# Patient Record
Sex: MALE | Race: WHITE | NOT HISPANIC OR LATINO | Employment: STUDENT | URBAN - METROPOLITAN AREA
[De-identification: names, ages, dates, MRNs, and addresses within clinical notes are randomized per-mention and may not be internally consistent; named-entity substitution may affect disease eponyms.]

---
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
## 2020-05-27 RX ORDER — BENZONATATE 100 MG/1
CAPSULE ORAL
COMMUNITY
Start: 2016-09-06

## 2020-05-27 RX ORDER — FLUTICASONE PROPIONATE 50 MCG
2 SPRAY, SUSPENSION (ML) NASAL
COMMUNITY
Start: 2017-02-10

## 2020-05-27 RX ORDER — NAPROXEN 500 MG/1
TABLET ORAL
COMMUNITY
Start: 2015-01-17

## 2020-05-27 RX ORDER — TRIAMCINOLONE ACETONIDE 0.1 %
PASTE (GRAM) DENTAL
COMMUNITY
Start: 2015-05-05

## 2020-05-27 RX ORDER — ACETAMINOPHEN AND CODEINE PHOSPHATE 300; 30 MG/1; MG/1
TABLET ORAL
COMMUNITY
Start: 2014-11-19

## 2020-05-27 RX ORDER — BUTALBITAL, ACETAMINOPHEN AND CAFFEINE 50; 325; 40 MG/1; MG/1; MG/1
CAPSULE ORAL
COMMUNITY
Start: 2017-02-07

## 2020-05-27 RX ORDER — DEXTROMETHORPHAN HYDROBROMIDE AND PROMETHAZINE HYDROCHLORIDE 15; 6.25 MG/5ML; MG/5ML
5 SYRUP ORAL
COMMUNITY
Start: 2017-02-10

## 2020-06-01 ENCOUNTER — OFFICE VISIT (OUTPATIENT)
Dept: GASTROENTEROLOGY | Facility: CLINIC | Age: 20
End: 2020-06-01
Payer: COMMERCIAL

## 2020-06-01 ENCOUNTER — TELEPHONE (OUTPATIENT)
Dept: GASTROENTEROLOGY | Facility: CLINIC | Age: 20
End: 2020-06-01

## 2020-06-01 VITALS
HEART RATE: 68 BPM | WEIGHT: 150 LBS | HEIGHT: 69 IN | DIASTOLIC BLOOD PRESSURE: 70 MMHG | BODY MASS INDEX: 22.22 KG/M2 | SYSTOLIC BLOOD PRESSURE: 100 MMHG

## 2020-06-01 DIAGNOSIS — R10.32 LEFT LOWER QUADRANT ABDOMINAL PAIN: Primary | ICD-10-CM

## 2020-06-01 DIAGNOSIS — K59.1 FUNCTIONAL DIARRHEA: ICD-10-CM

## 2020-06-01 PROCEDURE — 99204 OFFICE O/P NEW MOD 45 MIN: CPT | Performed by: INTERNAL MEDICINE

## 2020-06-01 RX ORDER — ONDANSETRON 4 MG/1
4 TABLET, FILM COATED ORAL
COMMUNITY

## 2020-06-01 RX ORDER — ONDANSETRON 4 MG/1
4 TABLET, FILM COATED ORAL EVERY 8 HOURS PRN
Qty: 60 TABLET | Refills: 2 | Status: SHIPPED | OUTPATIENT
Start: 2020-06-01

## 2020-06-01 RX ORDER — PHENOBARBITAL, HYOSCYAMINE SULFATE, ATROPINE SULFATE AND SCOPOLAMINE HYDROBROMIDE .0194; .1037; 16.2; .0065 MG/1; MG/1; MG/1; MG/1
1 TABLET ORAL EVERY 6 HOURS PRN
Qty: 60 TABLET | Refills: 3 | Status: SHIPPED | OUTPATIENT
Start: 2020-06-01 | End: 2020-06-03

## 2020-06-02 DIAGNOSIS — K59.1 FUNCTIONAL DIARRHEA: ICD-10-CM

## 2020-06-02 DIAGNOSIS — R10.32 LEFT LOWER QUADRANT ABDOMINAL PAIN: ICD-10-CM

## 2020-06-03 RX ORDER — PHENOBARBITAL, HYOSCYAMINE SULFATE, ATROPINE SULFATE, SCOPOLAMINE HYDROBROMIDE 16.2; .1037; .0194; .0065 MG/1; MG/1; MG/1; MG/1
TABLET ORAL
Qty: 60 TABLET | Refills: 3 | Status: SHIPPED | OUTPATIENT
Start: 2020-06-03

## 2020-06-13 ENCOUNTER — APPOINTMENT (OUTPATIENT)
Dept: LAB | Facility: HOSPITAL | Age: 20
End: 2020-06-13
Attending: INTERNAL MEDICINE
Payer: COMMERCIAL

## 2020-06-13 DIAGNOSIS — K59.1 FUNCTIONAL DIARRHEA: ICD-10-CM

## 2020-06-13 DIAGNOSIS — R10.32 LEFT LOWER QUADRANT ABDOMINAL PAIN: ICD-10-CM

## 2020-06-13 LAB
CRP SERPL QL: <3 MG/L
ERYTHROCYTE [SEDIMENTATION RATE] IN BLOOD: 1 MM/HOUR (ref 2–10)

## 2020-06-13 PROCEDURE — 86140 C-REACTIVE PROTEIN: CPT

## 2020-06-13 PROCEDURE — 86255 FLUORESCENT ANTIBODY SCREEN: CPT

## 2020-06-13 PROCEDURE — 83516 IMMUNOASSAY NONANTIBODY: CPT

## 2020-06-13 PROCEDURE — 85652 RBC SED RATE AUTOMATED: CPT

## 2020-06-13 PROCEDURE — 36415 COLL VENOUS BLD VENIPUNCTURE: CPT

## 2020-06-13 PROCEDURE — 86671 FUNGUS NES ANTIBODY: CPT

## 2020-06-16 ENCOUNTER — OFFICE VISIT (OUTPATIENT)
Dept: GASTROENTEROLOGY | Facility: CLINIC | Age: 20
End: 2020-06-16
Payer: COMMERCIAL

## 2020-06-16 VITALS
HEART RATE: 80 BPM | HEIGHT: 69 IN | DIASTOLIC BLOOD PRESSURE: 76 MMHG | BODY MASS INDEX: 22.42 KG/M2 | SYSTOLIC BLOOD PRESSURE: 110 MMHG | WEIGHT: 151.4 LBS

## 2020-06-16 DIAGNOSIS — K58.2 IRRITABLE BOWEL SYNDROME WITH BOTH CONSTIPATION AND DIARRHEA: Primary | ICD-10-CM

## 2020-06-16 LAB
BAKER'S YEAST IGG QN IA: 17 UNITS (ref 0–50)
CHITOBIOSIDE IGA SERPL IA-ACNC: 22 UNITS (ref 0–90)
IBD COMMENTS: NORMAL
LAMINARIBIOSIDE IGG SERPL IA-ACNC: 11 UNITS (ref 0–60)
MANNOBIOSIDE IGG SERPL IA-ACNC: 40 UNITS (ref 0–100)
P-ANCA ATYPICAL SER QL IF: NEGATIVE

## 2020-06-16 PROCEDURE — 99214 OFFICE O/P EST MOD 30 MIN: CPT | Performed by: INTERNAL MEDICINE

## 2020-06-16 RX ORDER — HYOSCYAMINE SULFATE 0.125 MG
0.12 TABLET ORAL EVERY 4 HOURS PRN
Qty: 30 TABLET | Refills: 0 | Status: SHIPPED | OUTPATIENT
Start: 2020-06-16

## 2020-08-22 ENCOUNTER — APPOINTMENT (EMERGENCY)
Dept: RADIOLOGY | Facility: HOSPITAL | Age: 20
End: 2020-08-22
Payer: COMMERCIAL

## 2020-08-22 ENCOUNTER — HOSPITAL ENCOUNTER (EMERGENCY)
Facility: HOSPITAL | Age: 20
Discharge: HOME | End: 2020-08-23
Attending: EMERGENCY MEDICINE | Admitting: EMERGENCY MEDICINE
Payer: COMMERCIAL

## 2020-08-22 DIAGNOSIS — S16.1XXA ACUTE STRAIN OF NECK MUSCLE, INITIAL ENCOUNTER: ICD-10-CM
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
## 2020-08-22 PROCEDURE — 99284 EMERGENCY DEPT VISIT MOD MDM: CPT

## 2020-08-22 PROCEDURE — 72040 X-RAY EXAM NECK SPINE 2-3 VW: CPT

## 2020-08-22 PROCEDURE — 99284 EMERGENCY DEPT VISIT MOD MDM: CPT | Performed by: EMERGENCY MEDICINE

## 2020-08-22 RX ORDER — IBUPROFEN 400 MG/1
800 TABLET ORAL ONCE
Status: COMPLETED | OUTPATIENT
Start: 2020-08-22 | End: 2020-08-22

## 2020-08-22 RX ADMIN — IBUPROFEN 800 MG: 400 TABLET ORAL at 23:49

## 2020-08-23 VITALS
RESPIRATION RATE: 18 BRPM | SYSTOLIC BLOOD PRESSURE: 122 MMHG | TEMPERATURE: 98.3 F | HEART RATE: 75 BPM | OXYGEN SATURATION: 97 % | DIASTOLIC BLOOD PRESSURE: 69 MMHG

## 2020-08-23 RX ORDER — IBUPROFEN 800 MG/1
800 TABLET ORAL 3 TIMES DAILY
Qty: 21 TABLET | Refills: 0 | Status: SHIPPED | OUTPATIENT
Start: 2020-08-23

## 2020-08-23 RX ORDER — CYCLOBENZAPRINE HCL 10 MG
10 TABLET ORAL ONCE
Status: COMPLETED | OUTPATIENT
Start: 2020-08-23 | End: 2020-08-23

## 2020-08-23 RX ORDER — CYCLOBENZAPRINE HCL 10 MG
10 TABLET ORAL 2 TIMES DAILY PRN
Qty: 14 TABLET | Refills: 0 | Status: SHIPPED | OUTPATIENT
Start: 2020-08-23

## 2020-08-23 RX ADMIN — CYCLOBENZAPRINE HYDROCHLORIDE 10 MG: 10 TABLET, FILM COATED ORAL at 00:39
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
## 2024-02-01 ENCOUNTER — OFFICE VISIT (OUTPATIENT)
Dept: DERMATOLOGY | Facility: CLINIC | Age: 24
End: 2024-02-01
Payer: COMMERCIAL

## 2024-02-01 VITALS — TEMPERATURE: 98.6 F | HEIGHT: 70 IN | WEIGHT: 182 LBS | BODY MASS INDEX: 26.05 KG/M2

## 2024-02-01 DIAGNOSIS — D22.9 MULTIPLE MELANOCYTIC NEVI: Primary | ICD-10-CM

## 2024-02-01 DIAGNOSIS — L81.4 LENTIGO: ICD-10-CM

## 2024-02-01 DIAGNOSIS — L85.3 XEROSIS OF SKIN: ICD-10-CM

## 2024-02-01 PROCEDURE — 99203 OFFICE O/P NEW LOW 30 MIN: CPT | Performed by: DERMATOLOGY

## 2024-02-01 RX ORDER — CEPHALEXIN 500 MG/1
CAPSULE ORAL
COMMUNITY
Start: 2024-01-19

## 2024-02-01 NOTE — PATIENT INSTRUCTIONS
"MELANOCYTIC NEVI (\"Moles\")        Assessment and Plan:  Based on a thorough discussion of this condition and the management approach to it (including a comprehensive discussion of the known risks, side effects and potential benefits of treatment), the patient (family) agrees to implement the following specific plan:  When outside we recommend using a wide brim hat, sunglasses, long sleeve and pants, sunscreen with SPF 30+ with reapplication every 2 hours, or SPF specific clothing   Benign, reassured  Annual skin check     Melanocytic Nevi  Melanocytic nevi (\"moles\") are tan or brown, raised or flat areas of the skin which have an increased number of melanocytes. Melanocytes are the cells in our body which make pigment and account for skin color.    Some moles are present at birth (I.e., \"congenital nevi\"), while others come up later in life (i.e., \"acquired nevi\").  The sun can stimulate the body to make more moles.  Sunburns are not the only thing that triggers more moles.  Chronic sun exposure can do it too.     Clinically distinguishing a healthy mole from melanoma may be difficult, even for experienced dermatologists. The \"ABCDE's\" of moles have been suggested as a means of helping to alert a person to a suspicious mole and the possible increased risk of melanoma.  The suggestions for raising alert are as follows:    Asymmetry: Healthy moles tend to be symmetric, while melanomas are often asymmetric.  Asymmetry means if you draw a line through the mole, the two halves do not match in color, size, shape, or surface texture. Asymmetry can be a result of rapid enlargement of a mole, the development of a raised area on a previously flat lesion, scaling, ulceration, bleeding or scabbing within the mole.  Any mole that starts to demonstrate \"asymmetry\" should be examined promptly by a board certified dermatologist.     Border: Healthy moles tend to have discrete, even borders.  The border of a melanoma often blends " "into the normal skin and does not sharply delineate the mole from normal skin.  Any mole that starts to demonstrate \"uneven borders\" should be examined promptly by a board certified dermatologist.     Color: Healthy moles tend to be one color throughout.  Melanomas tend to be made up of different colors ranging from dark black, blue, white, or red.  Any mole that demonstrates a color change should be examined promptly by a board certified dermatologist.     Diameter: Healthy moles tend to be smaller than 0.6 cm in size; an exception are \"congenital nevi\" that can be larger.  Melanomas tend to grow and can often be greater than 0.6 cm (1/4 of an inch, or the size of a pencil eraser). This is only a guideline, and many normal moles may be larger than 0.6 cm without being unhealthy.  Any mole that starts to change in size (small to bigger or bigger to smaller) should be examined promptly by a board certified dermatologist.     Evolving: Healthy moles tend to \"stay the same.\"  Melanomas may often show signs of change or evolution such as a change in size, shape, color, or elevation.  Any mole that starts to itch, bleed, crust, burn, hurt, or ulcerate or demonstrate a change or evolution should be examined promptly by a board certified dermatologist.        LENTIGO      Assessment and Plan:  Based on a thorough discussion of this condition and the management approach to it (including a comprehensive discussion of the known risks, side effects and potential benefits of treatment), the patient (family) agrees to implement the following specific plan:  When outside we recommend using a wide brim hat, sunglasses, long sleeve and pants, sunscreen with SPF 30+ with reapplication every 2 hours, or SPF specific clothing       What is a lentigo?  A lentigo is a pigmented flat or slightly raised lesion with a clearly defined edge. Unlike an ephelis (freckle), it does not fade in the winter months. There are several kinds of " "lentigo.  The name lentigo originally referred to its appearance resembling a small lentil. The plural of lentigo is lentigines, although “lentigos” is also in common use.    Who gets lentigines?  Lentigines can affect males and females of all ages and races. Solar lentigines are especially prevalent in fair skinned adults. Lentigines associated with syndromes are present at birth or arise during childhood.    What causes lentigines?  Common forms of lentigo are due to exposure to ultraviolet radiation:  Sun damage including sunburn   Indoor tanning   Phototherapy, especially photochemotherapy (PUVA)    Ionizing radiation, eg radiation therapy, can also cause lentigines.  Several familial syndromes associated with widespread lentigines originate from mutations in Alphonso-MAP kinase, mTOR signaling and PTEN pathways.    What is the treatment for lentigines?  Most lentigines are left alone. Attempts to lighten them may not be successful. The following approaches are used:  SPF 50+ broad-spectrum sunscreen   Hydroquinone bleaching cream   Alpha hydroxy acids   Vitamin C   Retinoids   Azelaic acid   Chemical peels  Individual lesions can be permanently removed using:  Cryotherapy   Intense pulsed light   Pigment lasers    How can lentigines be prevented?  Lentigines associated with exposure ultraviolet radiation can be prevented by very careful sun protection. Clothing is more successful at preventing new lentigines than are sunscreens.    What is the outlook for lentigines?  Lentigines usually persist. They may increase in number with age and sun exposure. Some in sun-protected sites may fade and disappear.            XEROSIS (\"DRY SKIN\")        Assessment and Plan:  Based on a thorough discussion of this condition and the management approach to it (including a comprehensive discussion of the known risks, side effects and potential benefits of treatment), the patient (family) agrees to implement the following specific " plan:  Use moisturizer like Eucerin,Cerave or Aveeno Cream 3 times a day for the dry skin            Dry skin refers to skin that feels dry to touch. Dry skin has a dull surface with a rough, scaly quality. The skin is less pliable and cracked. When dryness is severe, the skin may become inflamed and fissured.  Although any body site can be dry, dry skin tends to affect the shins more than any other site.    Dry skin is lacking moisture in the outer horny cell layer (stratum corneum) and this results in cracks in the skin surface.  Dry skin is also called xerosis, xeroderma or asteatosis (lack of fat).  It can affect males and females of all ages. There is some racial variability in water and lipid content of the skin.  Dry skin that starts in early childhood may be one of about 20 types of ichthyosis (fish-scale skin). There is often a family history of dry skin.   Dry skin is commonly seen in people with atopic dermatitis.  Nearly everyone > 60 years has dry skin.    Dry skin that begins later may be seen in people with certain diseases and conditions.  Postmenopausal women  Hypothyroidism  Chronic renal disease   Malnutrition and weight loss   Subclinical dermatitis   Treatment with certain drugs such as oral retinoids, diuretics and epidermal growth factor receptor inhibitors      What is the treatment for dry skin?  The mainstay of treatment of dry skin and ichthyosis is moisturisers/emollients. They should be applied liberally and often enough to:  Reduce itch   Improve the barrier function   Prevent entry of irritants, bacteria   Reduce transepidermal water loss.      How can dry skin be prevented?  Eliminate aggravating factors:  Reduce the frequency of bathing.   A humidifier in winter and air conditioner in summer   Compare having a short shower with a prolonged soak in a bath.   Use lukewarm, not hot, water.   Replace standard soap with a substitute such as a synthetic detergent cleanser, water-miscible  emollient, bath oil, anti-pruritic tar oil, colloidal oatmeal etc.   Apply an emollient liberally and often, particularly shortly after bathing, and when itchy. The drier the skin, the thicker this should be, especially on the hands.    What is the outlook for dry skin?  A tendency to dry skin may persist life-long, or it may improve once contributing factors are controlled.

## 2024-02-01 NOTE — PROGRESS NOTES
"[facility] Note     Patient Name: Riley Christina  Encounter Date: 2/1/2024    Have you been cared for by a [facility]atologist in the last 3 years and, if so, which description applies to you?    NO.   I am considered a \"new\" patient and must complete all patient intake questions. I am MALE/not capable of bearing children.    REVIEW OF SYSTEMS:  Have you recently had or currently have any of the following? Recent fever or chills? No  Any non-healing wound? No   PAST MEDICAL HISTORY:  Have you personally ever had or currently have any of the following?  If \"YES,\" then please provide more detail. Skin cancer (such as Melanoma, Basal Cell Carcinoma, Squamous Cell Carcinoma?  No  Tuberculosis, HIV/AIDS, Hepatitis B or C: No  Radiation Treatment No   HISTORY OF IMMUNOSUPPRESSION:   Do you have a history of any of the following:  Systemic Immunosuppression such as Diabetes, Biologic or Immunotherapy, Chemotherapy, Organ Transplantation, Bone Marrow Transplantation?  No     Answering \"YES\" requires the addition of the dotphrase \"IMMUNOSUPPRESSED\" as the first diagnosis of the patient's visit.   FAMILY HISTORY:  Any \"first degree relatives\" (parent, brother, sister, or child) with the following?    Skin Cancer, Pancreatic or Other Cancer? No   PATIENT EXPERIENCE:    Do you want the Dermatologist to perform a COMPLETE skin exam today including a clinical examination under the \"bra and underwear\" areas?  NO groin and buttocks not examined   If necessary, do we have your permission to call and leave a detailed message on your Preferred Phone number that includes your specific medical information?  Yes      No Known Allergies   Current Outpatient Medications:     Butalbital-APAP-Caffeine -40 MG per capsule, , Disp: , Rfl:     cephalexin (KEFLEX) 500 mg capsule, take 1 capsule by mouth four times a day for 7 days, Disp: , Rfl:     acetaminophen-codeine (TYLENOL #3) 300-30 mg per tablet, Take by " mouth (Patient not taking: Reported on 2/1/2024), Disp: , Rfl:     benzonatate (TESSALON PERLES) 100 mg capsule, Take by mouth (Patient not taking: Reported on 2/1/2024), Disp: , Rfl:     cyclobenzaprine (FLEXERIL) 10 mg tablet, Take 1 tablet (10 mg total) by mouth 2 (two) times a day as needed for muscle spasms for up to 14 doses (Patient not taking: Reported on 2/1/2024), Disp: 14 tablet, Rfl: 0    fluticasone (FLONASE) 50 mcg/act nasal spray, 2 sprays into each nostril (Patient not taking: Reported on 2/1/2024), Disp: , Rfl:     hyoscyamine (ANASPAZ,LEVSIN) 0.125 MG tablet, Take 1 tablet (0.125 mg total) by mouth every 4 (four) hours as needed for cramping (Patient not taking: Reported on 2/1/2024), Disp: 30 tablet, Rfl: 0    ibuprofen (MOTRIN) 800 mg tablet, Take 1 tablet (800 mg total) by mouth 3 (three) times a day (Patient not taking: Reported on 2/1/2024), Disp: 21 tablet, Rfl: 0    naproxen (NAPROSYN) 500 mg tablet, Take by mouth (Patient not taking: Reported on 2/1/2024), Disp: , Rfl:     ondansetron (ZOFRAN) 4 mg tablet, Take 4 mg by mouth (Patient not taking: Reported on 2/1/2024), Disp: , Rfl:     ondansetron (ZOFRAN) 4 mg tablet, Take 1 tablet (4 mg total) by mouth every 8 (eight) hours as needed for nausea or vomiting (Patient not taking: Reported on 2/1/2024), Disp: 60 tablet, Rfl: 2    PHENOHYTRO 16.2 MG TABS, TAKE 1 TABLET BY MOUTH EVERY 6 HOURS AS NEEDED (ABDOMINAL PAIN) (Patient not taking: Reported on 6/16/2020), Disp: 60 tablet, Rfl: 3    promethazine-dextromethorphan (PHENERGAN-DM) 6.25-15 mg/5 mL oral syrup, Take 5 mL by mouth (Patient not taking: Reported on 2/1/2024), Disp: , Rfl:     triamcinolone (KENALOG) 0.1 % oral topical paste, by Transmucosal route (Patient not taking: Reported on 2/1/2024), Disp: , Rfl:           Whom besides the patient is providing clinical information about today's encounter?   NO ADDITIONAL HISTORIAN (patient alone provided history)    Physical Exam and  "Assessment/Plan by Diagnosis:  Patient here for skin exam, has new moles that have appeared in the past year.     MELANOCYTIC NEVI (\"Moles\")    Physical Exam:  Anatomic Location Affected:   Mostly on sun-exposed areas of the trunk and extremities  Morphological Description:  Scattered, 1-4mm round to ovoid, symmetrical-appearing, even bordered, skin colored to dark brown macules/papules, mostly in sun-exposed areas  Pertinent Positives:  Pertinent Negatives:    Additional History of Present Condition:      Assessment and Plan:  Based on a thorough discussion of this condition and the management approach to it (including a comprehensive discussion of the known risks, side effects and potential benefits of treatment), the patient (family) agrees to implement the following specific plan:  When outside we recommend using a wide brim hat, sunglasses, long sleeve and pants, sunscreen with SPF 30+ with reapplication every 2 hours, or SPF specific clothing   Benign, reassured  Annual skin check     Melanocytic Nevi  Melanocytic nevi (\"moles\") are tan or brown, raised or flat areas of the skin which have an increased number of melanocytes. Melanocytes are the cells in our body which make pigment and account for skin color.    Some moles are present at birth (I.e., \"congenital nevi\"), while others come up later in life (i.e., \"acquired nevi\").  The sun can stimulate the body to make more moles.  Sunburns are not the only thing that triggers more moles.  Chronic sun exposure can do it too.     Clinically distinguishing a healthy mole from melanoma may be difficult, even for experienced dermatologists. The \"ABCDE's\" of moles have been suggested as a means of helping to alert a person to a suspicious mole and the possible increased risk of melanoma.  The suggestions for raising alert are as follows:    Asymmetry: Healthy moles tend to be symmetric, while melanomas are often asymmetric.  Asymmetry means if you draw a line through " "the mole, the two halves do not match in color, size, shape, or surface texture. Asymmetry can be a result of rapid enlargement of a mole, the development of a raised area on a previously flat lesion, scaling, ulceration, bleeding or scabbing within the mole.  Any mole that starts to demonstrate \"asymmetry\" should be examined promptly by a board certified dermatologist.     Border: Healthy moles tend to have discrete, even borders.  The border of a melanoma often blends into the normal skin and does not sharply delineate the mole from normal skin.  Any mole that starts to demonstrate \"uneven borders\" should be examined promptly by a board certified dermatologist.     Color: Healthy moles tend to be one color throughout.  Melanomas tend to be made up of different colors ranging from dark black, blue, white, or red.  Any mole that demonstrates a color change should be examined promptly by a board certified dermatologist.     Diameter: Healthy moles tend to be smaller than 0.6 cm in size; an exception are \"congenital nevi\" that can be larger.  Melanomas tend to grow and can often be greater than 0.6 cm (1/4 of an inch, or the size of a pencil eraser). This is only a guideline, and many normal moles may be larger than 0.6 cm without being unhealthy.  Any mole that starts to change in size (small to bigger or bigger to smaller) should be examined promptly by a board certified dermatologist.     Evolving: Healthy moles tend to \"stay the same.\"  Melanomas may often show signs of change or evolution such as a change in size, shape, color, or elevation.  Any mole that starts to itch, bleed, crust, burn, hurt, or ulcerate or demonstrate a change or evolution should be examined promptly by a board certified dermatologist.        LENTIGO    Physical Exam:  Anatomic Location Affected:  trunk, arms  Morphological Description:  Light brown macules  Pertinent Positives:  Pertinent Negatives:    Additional History of Present " Condition:      Assessment and Plan:  Based on a thorough discussion of this condition and the management approach to it (including a comprehensive discussion of the known risks, side effects and potential benefits of treatment), the patient (family) agrees to implement the following specific plan:  When outside we recommend using a wide brim hat, sunglasses, long sleeve and pants, sunscreen with SPF 30+ with reapplication every 2 hours, or SPF specific clothing       What is a lentigo?  A lentigo is a pigmented flat or slightly raised lesion with a clearly defined edge. Unlike an ephelis (freckle), it does not fade in the winter months. There are several kinds of lentigo.  The name lentigo originally referred to its appearance resembling a small lentil. The plural of lentigo is lentigines, although “lentigos” is also in common use.    Who gets lentigines?  Lentigines can affect males and females of all ages and races. Solar lentigines are especially prevalent in fair skinned adults. Lentigines associated with syndromes are present at birth or arise during childhood.    What causes lentigines?  Common forms of lentigo are due to exposure to ultraviolet radiation:  Sun damage including sunburn   Indoor tanning   Phototherapy, especially photochemotherapy (PUVA)    Ionizing radiation, eg radiation therapy, can also cause lentigines.  Several familial syndromes associated with widespread lentigines originate from mutations in Alphonso-MAP kinase, mTOR signaling and PTEN pathways.    What is the treatment for lentigines?  Most lentigines are left alone. Attempts to lighten them may not be successful. The following approaches are used:  SPF 50+ broad-spectrum sunscreen   Hydroquinone bleaching cream   Alpha hydroxy acids   Vitamin C   Retinoids   Azelaic acid   Chemical peels  Individual lesions can be permanently removed using:  Cryotherapy   Intense pulsed light   Pigment lasers    How can lentigines be  "prevented?  Lentigines associated with exposure ultraviolet radiation can be prevented by very careful sun protection. Clothing is more successful at preventing new lentigines than are sunscreens.    What is the outlook for lentigines?  Lentigines usually persist. They may increase in number with age and sun exposure. Some in sun-protected sites may fade and disappear.        XEROSIS (\"DRY SKIN\")    Physical Exam:  Anatomic Location Affected:  diffuse  Morphological Description:  xerosis  Pertinent Positives:  Pertinent Negatives:    Additional History of Present Condition:      Assessment and Plan:  Based on a thorough discussion of this condition and the management approach to it (including a comprehensive discussion of the known risks, side effects and potential benefits of treatment), the patient (family) agrees to implement the following specific plan:  Use moisturizer like Eucerin,Cerave or Aveeno Cream 3 times a day for the dry skin            Dry skin refers to skin that feels dry to touch. Dry skin has a dull surface with a rough, scaly quality. The skin is less pliable and cracked. When dryness is severe, the skin may become inflamed and fissured.  Although any body site can be dry, dry skin tends to affect the shins more than any other site.    Dry skin is lacking moisture in the outer horny cell layer (stratum corneum) and this results in cracks in the skin surface.  Dry skin is also called xerosis, xeroderma or asteatosis (lack of fat).  It can affect males and females of all ages. There is some racial variability in water and lipid content of the skin.  Dry skin that starts in early childhood may be one of about 20 types of ichthyosis (fish-scale skin). There is often a family history of dry skin.   Dry skin is commonly seen in people with atopic dermatitis.  Nearly everyone > 60 years has dry skin.    Dry skin that begins later may be seen in people with certain diseases and " conditions.  Postmenopausal women  Hypothyroidism  Chronic renal disease   Malnutrition and weight loss   Subclinical dermatitis   Treatment with certain drugs such as oral retinoids, diuretics and epidermal growth factor receptor inhibitors      What is the treatment for dry skin?  The mainstay of treatment of dry skin and ichthyosis is moisturisers/emollients. They should be applied liberally and often enough to:  Reduce itch   Improve the barrier function   Prevent entry of irritants, bacteria   Reduce transepidermal water loss.      How can dry skin be prevented?  Eliminate aggravating factors:  Reduce the frequency of bathing.   A humidifier in winter and air conditioner in summer   Compare having a short shower with a prolonged soak in a bath.   Use lukewarm, not hot, water.   Replace standard soap with a substitute such as a synthetic detergent cleanser, water-miscible emollient, bath oil, anti-pruritic tar oil, colloidal oatmeal etc.   Apply an emollient liberally and often, particularly shortly after bathing, and when itchy. The drier the skin, the thicker this should be, especially on the hands.    What is the outlook for dry skin?  A tendency to dry skin may persist life-long, or it may improve once contributing factors are controlled.     Scribe Attestation      I,:  Navya Dorsey MA am acting as a scribe while in the presence of the attending physician.:       I,:  Jennifer Pennington MD personally performed the services described in this documentation    as scribed in my presence.:

## 2024-06-04 ENCOUNTER — APPOINTMENT (EMERGENCY)
Dept: RADIOLOGY | Facility: HOSPITAL | Age: 24
End: 2024-06-04
Payer: COMMERCIAL

## 2024-06-04 ENCOUNTER — HOSPITAL ENCOUNTER (EMERGENCY)
Facility: HOSPITAL | Age: 24
Discharge: HOME | End: 2024-06-04
Attending: EMERGENCY MEDICINE
Payer: COMMERCIAL

## 2024-06-04 VITALS
HEIGHT: 69 IN | HEART RATE: 82 BPM | WEIGHT: 184 LBS | TEMPERATURE: 98.8 F | OXYGEN SATURATION: 98 % | SYSTOLIC BLOOD PRESSURE: 123 MMHG | DIASTOLIC BLOOD PRESSURE: 77 MMHG | BODY MASS INDEX: 27.25 KG/M2 | RESPIRATION RATE: 22 BRPM

## 2024-06-04 DIAGNOSIS — R07.9 CHEST PAIN: Primary | ICD-10-CM

## 2024-06-04 DIAGNOSIS — R10.13 EPIGASTRIC PAIN: ICD-10-CM

## 2024-06-04 DIAGNOSIS — R11.2 NAUSEA AND VOMITING: ICD-10-CM

## 2024-06-04 DIAGNOSIS — E87.6 HYPOKALEMIA: ICD-10-CM

## 2024-06-04 LAB
2HR DELTA HS TROPONIN: -1 NG/L
ALBUMIN SERPL BCP-MCNC: 4.2 G/DL (ref 3.5–5)
ALP SERPL-CCNC: 59 U/L (ref 34–104)
ALT SERPL W P-5'-P-CCNC: 14 U/L (ref 7–52)
ANION GAP SERPL CALCULATED.3IONS-SCNC: 9 MMOL/L (ref 4–13)
AST SERPL W P-5'-P-CCNC: 16 U/L (ref 13–39)
ATRIAL RATE: 91 BPM
BASOPHILS # BLD AUTO: 0.04 THOUSANDS/ÂΜL (ref 0–0.1)
BASOPHILS NFR BLD AUTO: 0 % (ref 0–1)
BILIRUB SERPL-MCNC: 0.33 MG/DL (ref 0.2–1)
BUN SERPL-MCNC: 6 MG/DL (ref 5–25)
CALCIUM SERPL-MCNC: 8.9 MG/DL (ref 8.4–10.2)
CARDIAC TROPONIN I PNL SERPL HS: 19 NG/L
CARDIAC TROPONIN I PNL SERPL HS: 20 NG/L
CHLORIDE SERPL-SCNC: 104 MMOL/L (ref 96–108)
CO2 SERPL-SCNC: 24 MMOL/L (ref 21–32)
CREAT SERPL-MCNC: 0.98 MG/DL (ref 0.6–1.3)
EOSINOPHIL # BLD AUTO: 0.03 THOUSAND/ÂΜL (ref 0–0.61)
EOSINOPHIL NFR BLD AUTO: 0 % (ref 0–6)
ERYTHROCYTE [DISTWIDTH] IN BLOOD BY AUTOMATED COUNT: 13.4 % (ref 11.6–15.1)
FLUAV RNA RESP QL NAA+PROBE: NEGATIVE
FLUBV RNA RESP QL NAA+PROBE: NEGATIVE
GFR SERPL CREATININE-BSD FRML MDRD: 108 ML/MIN/1.73SQ M
GLUCOSE SERPL-MCNC: 105 MG/DL (ref 65–140)
HCT VFR BLD AUTO: 40.9 % (ref 36.5–49.3)
HGB BLD-MCNC: 14.1 G/DL (ref 12–17)
IMM GRANULOCYTES # BLD AUTO: 0.06 THOUSAND/UL (ref 0–0.2)
IMM GRANULOCYTES NFR BLD AUTO: 0 % (ref 0–2)
LIPASE SERPL-CCNC: 6 U/L (ref 11–82)
LYMPHOCYTES # BLD AUTO: 1.25 THOUSANDS/ÂΜL (ref 0.6–4.47)
LYMPHOCYTES NFR BLD AUTO: 9 % (ref 14–44)
MCH RBC QN AUTO: 30.1 PG (ref 26.8–34.3)
MCHC RBC AUTO-ENTMCNC: 34.5 G/DL (ref 31.4–37.4)
MCV RBC AUTO: 87 FL (ref 82–98)
MONOCYTES # BLD AUTO: 1.79 THOUSAND/ÂΜL (ref 0.17–1.22)
MONOCYTES NFR BLD AUTO: 12 % (ref 4–12)
NEUTROPHILS # BLD AUTO: 11.49 THOUSANDS/ÂΜL (ref 1.85–7.62)
NEUTS SEG NFR BLD AUTO: 79 % (ref 43–75)
NRBC BLD AUTO-RTO: 0 /100 WBCS
P AXIS: 70 DEGREES
PLATELET # BLD AUTO: 220 THOUSANDS/UL (ref 149–390)
PMV BLD AUTO: 10 FL (ref 8.9–12.7)
POTASSIUM SERPL-SCNC: 2.9 MMOL/L (ref 3.5–5.3)
PR INTERVAL: 156 MS
PROT SERPL-MCNC: 6.9 G/DL (ref 6.4–8.4)
QRS AXIS: 78 DEGREES
QRSD INTERVAL: 90 MS
QT INTERVAL: 354 MS
QTC INTERVAL: 418 MS
RBC # BLD AUTO: 4.68 MILLION/UL (ref 3.88–5.62)
RSV RNA RESP QL NAA+PROBE: NEGATIVE
SARS-COV-2 RNA RESP QL NAA+PROBE: NEGATIVE
SODIUM SERPL-SCNC: 137 MMOL/L (ref 135–147)
T WAVE AXIS: 30 DEGREES
VENTRICULAR RATE: 84 BPM
WBC # BLD AUTO: 14.66 THOUSAND/UL (ref 4.31–10.16)

## 2024-06-04 PROCEDURE — 96365 THER/PROPH/DIAG IV INF INIT: CPT

## 2024-06-04 PROCEDURE — 93010 ELECTROCARDIOGRAM REPORT: CPT | Performed by: INTERNAL MEDICINE

## 2024-06-04 PROCEDURE — 85025 COMPLETE CBC W/AUTO DIFF WBC: CPT

## 2024-06-04 PROCEDURE — 96375 TX/PRO/DX INJ NEW DRUG ADDON: CPT

## 2024-06-04 PROCEDURE — 96366 THER/PROPH/DIAG IV INF ADDON: CPT

## 2024-06-04 PROCEDURE — 84484 ASSAY OF TROPONIN QUANT: CPT

## 2024-06-04 PROCEDURE — 99284 EMERGENCY DEPT VISIT MOD MDM: CPT

## 2024-06-04 PROCEDURE — C9113 INJ PANTOPRAZOLE SODIUM, VIA: HCPCS

## 2024-06-04 PROCEDURE — 80053 COMPREHEN METABOLIC PANEL: CPT

## 2024-06-04 PROCEDURE — 71046 X-RAY EXAM CHEST 2 VIEWS: CPT

## 2024-06-04 PROCEDURE — 36415 COLL VENOUS BLD VENIPUNCTURE: CPT

## 2024-06-04 PROCEDURE — 0241U HB NFCT DS VIR RESP RNA 4 TRGT: CPT

## 2024-06-04 PROCEDURE — 93005 ELECTROCARDIOGRAM TRACING: CPT

## 2024-06-04 PROCEDURE — 83690 ASSAY OF LIPASE: CPT | Performed by: EMERGENCY MEDICINE

## 2024-06-04 PROCEDURE — 99285 EMERGENCY DEPT VISIT HI MDM: CPT

## 2024-06-04 RX ORDER — PANTOPRAZOLE SODIUM 40 MG/10ML
40 INJECTION, POWDER, LYOPHILIZED, FOR SOLUTION INTRAVENOUS ONCE
Status: COMPLETED | OUTPATIENT
Start: 2024-06-04 | End: 2024-06-04

## 2024-06-04 RX ORDER — POTASSIUM CHLORIDE 14.9 MG/ML
20 INJECTION INTRAVENOUS ONCE
Status: COMPLETED | OUTPATIENT
Start: 2024-06-04 | End: 2024-06-04

## 2024-06-04 RX ORDER — ONDANSETRON 2 MG/ML
4 INJECTION INTRAMUSCULAR; INTRAVENOUS ONCE
Status: COMPLETED | OUTPATIENT
Start: 2024-06-04 | End: 2024-06-04

## 2024-06-04 RX ORDER — LIDOCAINE 50 MG/G
1 PATCH TOPICAL ONCE
Status: DISCONTINUED | OUTPATIENT
Start: 2024-06-04 | End: 2024-06-04 | Stop reason: HOSPADM

## 2024-06-04 RX ORDER — ONDANSETRON 4 MG/1
4 TABLET, ORALLY DISINTEGRATING ORAL EVERY 8 HOURS PRN
Qty: 9 TABLET | Refills: 0 | Status: SHIPPED | OUTPATIENT
Start: 2024-06-04 | End: 2024-06-07

## 2024-06-04 RX ORDER — POTASSIUM CHLORIDE 20 MEQ/1
40 TABLET, EXTENDED RELEASE ORAL ONCE
Status: COMPLETED | OUTPATIENT
Start: 2024-06-04 | End: 2024-06-04

## 2024-06-04 RX ORDER — PANTOPRAZOLE SODIUM 20 MG/1
20 TABLET, DELAYED RELEASE ORAL DAILY
Qty: 7 TABLET | Refills: 0 | Status: SHIPPED | OUTPATIENT
Start: 2024-06-04 | End: 2024-06-11

## 2024-06-04 RX ORDER — LIDOCAINE 50 MG/G
1 PATCH TOPICAL DAILY
Qty: 5 PATCH | Refills: 0 | Status: SHIPPED | OUTPATIENT
Start: 2024-06-04 | End: 2024-06-09

## 2024-06-04 RX ORDER — FAMOTIDINE 10 MG/ML
20 INJECTION, SOLUTION INTRAVENOUS ONCE
Status: COMPLETED | OUTPATIENT
Start: 2024-06-04 | End: 2024-06-04

## 2024-06-04 RX ORDER — FAMOTIDINE 20 MG/1
20 TABLET, FILM COATED ORAL 2 TIMES DAILY
Qty: 14 TABLET | Refills: 0 | Status: SHIPPED | OUTPATIENT
Start: 2024-06-04 | End: 2024-06-11

## 2024-06-04 RX ADMIN — ONDANSETRON 4 MG: 2 INJECTION INTRAMUSCULAR; INTRAVENOUS at 01:21

## 2024-06-04 RX ADMIN — FAMOTIDINE 20 MG: 10 INJECTION, SOLUTION INTRAVENOUS at 01:21

## 2024-06-04 RX ADMIN — PANTOPRAZOLE SODIUM 40 MG: 40 INJECTION, POWDER, FOR SOLUTION INTRAVENOUS at 01:21

## 2024-06-04 RX ADMIN — POTASSIUM CHLORIDE 40 MEQ: 1500 TABLET, EXTENDED RELEASE ORAL at 01:30

## 2024-06-04 RX ADMIN — LIDOCAINE 1 PATCH: 50 PATCH CUTANEOUS at 01:21

## 2024-06-04 RX ADMIN — SODIUM CHLORIDE 1000 ML: 0.9 INJECTION, SOLUTION INTRAVENOUS at 01:20

## 2024-06-04 RX ADMIN — POTASSIUM CHLORIDE 20 MEQ: 14.9 INJECTION, SOLUTION INTRAVENOUS at 01:22

## 2024-06-04 NOTE — Clinical Note
Riley Christina was seen and treated in our emergency department on 6/4/2024.                Diagnosis:     Riley  may return to work on return date.    He may return on this date: 06/05/2024         If you have any questions or concerns, please don't hesitate to call.      Eula Zhong PA-C    ______________________________           _______________          _______________  Hospital Representative                              Date                                Time

## 2024-06-04 NOTE — ED PROVIDER NOTES
History  Chief Complaint   Patient presents with    Chest Pain     Pt reports vomiting yesterday and since this morning having a tight feeling in his chest. Pt reports not being able to catch breath. Pt reports going to the urgent care this morning and getting an inhaler. Pt denies relief with inhaler. Pt denies vomiting today, reports nausea     Patient is a 23-year-old male who presents to the emergency room for chest pain.  Patient reports epigastric pain with associated nausea and vomiting started yesterday.  Reports chest pain since this morning that radiates up from his upper abdomen.  Associated nausea today.  No vomiting today.  Denies any other symptoms.  Had Tylenol around 9:30 PM.  Tried inhaler.          Prior to Admission Medications   Prescriptions Last Dose Informant Patient Reported? Taking?   Butalbital-APAP-Caffeine -40 MG per capsule  Self Yes No   PHENOHYTRO 16.2 MG TABS  Self No No   Sig: TAKE 1 TABLET BY MOUTH EVERY 6 HOURS AS NEEDED (ABDOMINAL PAIN)   Patient not taking: Reported on 2020   acetaminophen-codeine (TYLENOL #3) 300-30 mg per tablet  Self Yes No   Sig: Take by mouth   Patient not taking: Reported on 2024   benzonatate (TESSALON PERLES) 100 mg capsule  Self Yes No   Sig: Take by mouth   Patient not taking: Reported on 2024   cephalexin (KEFLEX) 500 mg capsule   Yes No   Sig: take 1 capsule by mouth four times a day for 7 days   cyclobenzaprine (FLEXERIL) 10 mg tablet   No No   Sig: Take 1 tablet (10 mg total) by mouth 2 (two) times a day as needed for muscle spasms for up to 14 doses   Patient not taking: Reported on 2024   fluticasone (FLONASE) 50 mcg/act nasal spray  Self Yes No   Si sprays into each nostril   Patient not taking: Reported on 2024   hyoscyamine (ANASPAZ,LEVSIN) 0.125 MG tablet   No No   Sig: Take 1 tablet (0.125 mg total) by mouth every 4 (four) hours as needed for cramping   Patient not taking: Reported on 2024   ibuprofen  (MOTRIN) 800 mg tablet   No No   Sig: Take 1 tablet (800 mg total) by mouth 3 (three) times a day   Patient not taking: Reported on 2/1/2024   naproxen (NAPROSYN) 500 mg tablet  Self Yes No   Sig: Take by mouth   Patient not taking: Reported on 2/1/2024   ondansetron (ZOFRAN) 4 mg tablet  Self Yes No   Sig: Take 4 mg by mouth   Patient not taking: Reported on 2/1/2024   ondansetron (ZOFRAN) 4 mg tablet  Self No No   Sig: Take 1 tablet (4 mg total) by mouth every 8 (eight) hours as needed for nausea or vomiting   Patient not taking: Reported on 2/1/2024   promethazine-dextromethorphan (PHENERGAN-DM) 6.25-15 mg/5 mL oral syrup  Self Yes No   Sig: Take 5 mL by mouth   Patient not taking: Reported on 2/1/2024   triamcinolone (KENALOG) 0.1 % oral topical paste  Self Yes No   Sig: by Transmucosal route   Patient not taking: Reported on 2/1/2024      Facility-Administered Medications: None       Past Medical History:   Diagnosis Date    Migraine     Seizures (HCC)        Past Surgical History:   Procedure Laterality Date    MULTIPLE TOOTH EXTRACTIONS         History reviewed. No pertinent family history.  I have reviewed and agree with the history as documented.    E-Cigarette/Vaping    E-Cigarette Use Never User      E-Cigarette/Vaping Substances    Nicotine No     THC No     CBD No     Flavoring No     Other No     Unknown No      Social History     Tobacco Use    Smoking status: Every Day     Current packs/day: 1.00     Types: Cigarettes    Smokeless tobacco: Never   Vaping Use    Vaping status: Never Used   Substance Use Topics    Alcohol use: No    Drug use: No       Review of Systems   Constitutional:  Negative for chills and fever.   HENT:  Negative for ear pain, sore throat, trouble swallowing and voice change.    Eyes:  Negative for pain and visual disturbance.   Respiratory:  Negative for cough and shortness of breath.    Cardiovascular:  Positive for chest pain. Negative for palpitations.   Gastrointestinal:   Positive for abdominal pain, nausea and vomiting. Negative for blood in stool, constipation and diarrhea.   Genitourinary:  Negative for dysuria, flank pain and hematuria.   Musculoskeletal:  Negative for arthralgias and back pain.   Skin:  Negative for color change and rash.   Neurological:  Negative for seizures, syncope and headaches.   Psychiatric/Behavioral:  Negative for confusion.    All other systems reviewed and are negative.      Physical Exam  Physical Exam  Vitals and nursing note reviewed.   Constitutional:       General: He is not in acute distress.     Appearance: He is well-developed.   HENT:      Head: Normocephalic and atraumatic.   Eyes:      Conjunctiva/sclera: Conjunctivae normal.   Cardiovascular:      Rate and Rhythm: Normal rate and regular rhythm.      Pulses: Normal pulses.      Heart sounds: No murmur heard.  Pulmonary:      Effort: Pulmonary effort is normal. No respiratory distress.      Breath sounds: Normal breath sounds.   Chest:      Chest wall: Tenderness present. No mass, lacerations, deformity, swelling, crepitus or edema.       Abdominal:      Palpations: Abdomen is soft.      Tenderness: There is abdominal tenderness in the epigastric area.   Musculoskeletal:         General: No swelling.      Cervical back: Neck supple.   Skin:     General: Skin is warm and dry.      Capillary Refill: Capillary refill takes less than 2 seconds.   Neurological:      Mental Status: He is alert.   Psychiatric:         Mood and Affect: Mood normal.         Vital Signs  ED Triage Vitals [06/04/24 0024]   Temperature Pulse Respirations Blood Pressure SpO2   98.8 °F (37.1 °C) 91 16 132/69 98 %      Temp Source Heart Rate Source Patient Position - Orthostatic VS BP Location FiO2 (%)   Oral Monitor Lying Right arm --      Pain Score       7           Vitals:    06/04/24 0100 06/04/24 0200 06/04/24 0300 06/04/24 0400   BP: 123/66 117/59 131/68 123/77   Pulse: 85 86 78 82   Patient Position - Orthostatic  VS: Lying Lying Lying Lying         Visual Acuity      ED Medications  Medications   lidocaine (LIDODERM) 5 % patch 1 patch (1 patch Topical Medication Applied 6/4/24 0121)   sodium chloride 0.9 % bolus 1,000 mL (0 mL Intravenous Stopped 6/4/24 0258)   Famotidine (PF) (PEPCID) injection 20 mg (20 mg Intravenous Given 6/4/24 0121)   pantoprazole (PROTONIX) injection 40 mg (40 mg Intravenous Given 6/4/24 0121)   potassium chloride (Klor-Con M20) CR tablet 40 mEq (40 mEq Oral Given 6/4/24 0130)   potassium chloride 20 mEq IVPB (premix) (0 mEq Intravenous Stopped 6/4/24 0356)   ondansetron (ZOFRAN) injection 4 mg (4 mg Intravenous Given 6/4/24 0121)       Diagnostic Studies  Results Reviewed       Procedure Component Value Units Date/Time    HS Troponin I 2hr [[id number]]  (Normal) Collected: 06/04/24 0258    Lab Status: Final result Specimen: Blood from Arm, Right Updated: 06/04/24 0330     hs TnI 2hr 19 ng/L      Delta 2hr hsTnI -1 ng/L     Lipase [[id number]]  (Abnormal) Collected: 06/04/24 0031    Lab Status: Final result Specimen: Blood from Arm, Left Updated: 06/04/24 0138     Lipase 6 u/L     FLU/RSV/COVID - if FLU/RSV clinically relevant [[id number]]  (Normal) Collected: 06/04/24 0031    Lab Status: Final result Specimen: Nares from Nose Updated: 06/04/24 0122     SARS-CoV-2 Negative     INFLUENZA A PCR Negative     INFLUENZA B PCR Negative     RSV PCR Negative    Narrative:      FOR PEDIATRIC PATIENTS - copy/paste COVID Guidelines URL to browser: [email or web address]    SARS-CoV-2 assay is a Nucleic Acid Amplification assay intended for the  qualitative detection of nucleic acid from SARS-CoV-2 in nasopharyngeal  swabs. Results are for the presumptive identification of SARS-CoV-2 RNA.    Positive results are indicative of infection with SARS-CoV-2, the virus  causing COVID-19, but do not rule out bacterial infection or co-infection  with other viruses.  Laboratories within the [place] and its  territories are required to report all positive results to the appropriate  public health authorities. Negative results do not preclude SARS-CoV-2  infection and should not be used as the sole basis for treatment or other  patient management decisions. Negative results must be combined with  clinical observations, patient history, and epidemiological information.  This test has not been FDA cleared or approved.    This test has been authorized by FDA under an Emergency Use Authorization  (EUA). This test is only authorized for the duration of time the  declaration that circumstances exist justifying the authorization of the  emergency use of an in vitro diagnostic tests for detection of SARS-CoV-2  virus and/or diagnosis of COVID-19 infection under section 564(b)(1) of  the Act, 21 U.S.C. 360bbb-3(b)(1), unless the authorization is terminated  or revoked sooner. The test has been validated but independent review by FDA  and CLIA is pending.    Test performed using [employer] GeneXpert: This RT-PCR assay targets N2,  a region unique to SARS-CoV-2. A conserved region in the E-gene was chosen  for pan-Sarbecovirus detection which includes SARS-CoV-2.    According to CMS-2020-01-R, this platform meets the definition of high-throughput technology.    HS Troponin 0hr (reflex protocol) [[id number]]  (Normal) Collected: 06/04/24 0031    Lab Status: Final result Specimen: Blood from Arm, Left Updated: 06/04/24 0112     hs TnI 0hr 20 ng/L     Comprehensive metabolic panel [[id number]]  (Abnormal) Collected: 06/04/24 0031    Lab Status: Final result Specimen: Blood from Arm, Left Updated: 06/04/24 0055     Sodium 137 mmol/L      Potassium 2.9 mmol/L      Chloride 104 mmol/L      CO2 24 mmol/L      ANION GAP 9 mmol/L      BUN 6 mg/dL      Creatinine 0.98 mg/dL      Glucose 105 mg/dL      Calcium 8.9 mg/dL      AST 16 U/L      ALT 14 U/L      Alkaline Phosphatase 59 U/L      Total  Protein 6.9 g/dL      Albumin 4.2 g/dL      Total Bilirubin 0.33 mg/dL      eGFR 108 ml/min/1.73sq m     Narrative:      National Kidney Disease Foundation guidelines for Chronic Kidney Disease (CKD):     Stage 1 with normal or high GFR (GFR > 90 mL/min/1.73 square meters)    Stage 2 Mild CKD (GFR = 60-89 mL/min/1.73 square meters)    Stage 3A Moderate CKD (GFR = 45-59 mL/min/1.73 square meters)    Stage 3B Moderate CKD (GFR = 30-44 mL/min/1.73 square meters)    Stage 4 Severe CKD (GFR = 15-29 mL/min/1.73 square meters)    Stage 5 End Stage CKD (GFR <15 mL/min/1.73 square meters)  Note: GFR calculation is accurate only with a steady state creatinine    CBC and differential [[id number]]  (Abnormal) Collected: 06/04/24 0031    Lab Status: Final result Specimen: Blood from Arm, Left Updated: 06/04/24 0039     WBC 14.66 Thousand/uL      RBC 4.68 Million/uL      Hemoglobin 14.1 g/dL      Hematocrit 40.9 %      MCV 87 fL      MCH 30.1 pg      MCHC 34.5 g/dL      RDW 13.4 %      MPV 10.0 fL      Platelets 220 Thousands/uL      nRBC 0 /100 WBCs      Segmented % 79 %      Immature Grans % 0 %      Lymphocytes % 9 %      Monocytes % 12 %      Eosinophils Relative 0 %      Basophils Relative 0 %      Absolute Neutrophils 11.49 Thousands/µL      Absolute Immature Grans 0.06 Thousand/uL      Absolute Lymphocytes 1.25 Thousands/µL      Absolute Monocytes 1.79 Thousand/µL      Eosinophils Absolute 0.03 Thousand/µL      Basophils Absolute 0.04 Thousands/µL                    XR chest 2 views   ED Interpretation by Eula Zhong PA-C (06/04 0102)   Old clavicle fracture.  No acute cardiopulmonary abnormality.                 Procedures  ECG 12 Lead Documentation Only    Date/Time: 6/4/2024 12:27 AM    Performed by: Eula Zhong PA-C  Authorized by: Eula Zhong PA-C    Indications / Diagnosis:  Cardiac work-up  ECG reviewed by me, the ED Provider: yes    Patient location:  ED  Interpretation:     Interpretation: normal     Rate:     ECG rate:  84    ECG rate assessment: normal    Rhythm:     Rhythm: sinus rhythm    ST segments:     ST segments:  Normal  T waves:     T waves: normal             ED Course  ED Course as of 06/04/24 0526 Tue Jun 04, 2024 0042 WBC(!): 14.66   0058 Potassium(!): 2.9   0349 Delta 2hr hsTnI: -1             HEART Risk Score      Flowsheet Row Most Recent Value   Heart Score Risk Calculator    History 0 Filed at: 06/04/2024 0357   ECG 0 Filed at: 06/04/2024 0357   Age 0 Filed at: 06/04/2024 0357   Risk Factors 1 Filed at: 06/04/2024 0357   Troponin 1 Filed at: 06/04/2024 0357   HEART Score 2 Filed at: 06/04/2024 0357                          SBIRT 20yo+      Flowsheet Row Most Recent Value   Initial Alcohol Screen: US AUDIT-C     1. How often do you have a drink containing alcohol? 0 Filed at: 06/04/2024 0026   2. How many drinks containing alcohol do you have on a typical day you are drinking?  0 Filed at: 06/04/2024 0026   3a. Male UNDER 65: How often do you have five or more drinks on one occasion? 0 Filed at: 06/04/2024 0026   3b. FEMALE Any Age, or MALE 65+: How often do you have 4 or more drinks on one occassion? 0 Filed at: 06/04/2024 0026   Audit-C Score 0 Filed at: 06/04/2024 0026   MANNY: How many times in the past year have you...    Used an illegal drug or used a prescription medication for non-medical reasons? Never Filed at: 06/04/2024 0026                      Medical Decision Making  23-year-old male presenting for chest pain, nausea, vomiting, epigastric pain x2 days.  Vital signs stable throughout ED course.  Chest and epigastric tenderness on physical exam.  No other acute physical exam findings.  Negative cardiac workup.  WBC 14.66.  Potassium 2.9, repleted IV and p.o.  Symptom improvement after medical management.  Patient to follow-up with GI, referral placed.  Patient given prescriptions for Pepcid, Protonix, Zofran and lidocaine patches as needed.  Patient to follow-up with his  primary care provider and return to the emergency room for any worsening symptoms.  Discussed return precautions.  Patient understands and agrees with discharge plan.    Amount and/or Complexity of Data Reviewed  Labs: ordered. Decision-making details documented in ED Course.  Radiology: independent interpretation performed.    Risk  Prescription drug management.             Disposition  Final diagnoses:   Chest pain   Epigastric pain   Nausea and vomiting   Hypokalemia     Time reflects when diagnosis was documented in both MDM as applicable and the Disposition within this note       Time User Action Codes Description Comment    6/4/2024  4:12 AM Eula Zhong Add [R07.9] Chest pain     6/4/2024  4:12 AM Onida, Eula Add [R10.13] Epigastric pain     6/4/2024  4:13 AM Onida, Eula Add [R11.2] Nausea and vomiting     6/4/2024  4:14 AM Xiomara Zhongia Add [E87.6] Hypokalemia           ED Disposition       ED Disposition   Discharge    Condition   Stable    Date/Time   Tue Jun 4, 2024 0412    Comment   Riley Christina discharge to home/self care.                   Follow-up Information       Follow up With Specialties Details Why Contact Info Additional Information    Your PCP         Dani Pulido, DO Family Medicine   [address] [place] [id number]  [phone]       [facility] Emergency Department Emergency Medicine Go to  If symptoms worsen [address] [facility] [id number] [phone] [facility] Emergency Department, [address] [place], [place], [address]    [facility]   [address]  [address]  [facility] [address]  [phone] [facility], [address] [place] [address], [address] [place], [place], [address] [phone]             Discharge Medication List as of 6/4/2024  4:16 AM         START taking these medications    Details   famotidine (PEPCID) 20 mg tablet Take 1 tablet (20 mg total) by mouth 2 (two) times a day for 7 days, Starting Tue 6/4/2024, Until Tue 6/11/2024, Normal      lidocaine (Lidoderm) 5 % Apply 1 patch topically over 12 hours daily for 5 days Remove & Discard patch within 12 hours or as directed by MD, Starting Tue 6/4/2024, Until Sun 6/9/2024, Normal      ondansetron (ZOFRAN-ODT) 4 mg disintegrating tablet Take 1 tablet (4 mg total) by mouth every 8 (eight) hours as needed for nausea or vomiting for up to 3 days, Starting Tue 6/4/2024, Until Fri 6/7/2024 at 2359, Normal      pantoprazole (PROTONIX) 20 mg tablet Take 1 tablet (20 mg total) by mouth daily for 7 days, Starting Tue 6/4/2024, Until Tue 6/11/2024, Normal           CONTINUE these medications which have NOT CHANGED    Details   acetaminophen-codeine (TYLENOL #3) 300-30 mg per tablet Take by mouth, Starting Wed 11/19/2014, Historical Med      benzonatate (TESSALON PERLES) 100 mg capsule Take by mouth, Starting Tue 9/6/2016, Historical Med      Butalbital-APAP-Caffeine -40 MG per capsule Starting Tue 2/7/2017, Historical Med      cephalexin (KEFLEX) 500 mg capsule take 1 capsule by mouth four times a day for 7 days, Historical Med      cyclobenzaprine (FLEXERIL) 10 mg tablet Take 1 tablet (10 mg total) by mouth 2 (two) times a day as needed for muscle spasms for up to 14 doses, Starting Sun 8/23/2020, Print      fluticasone (FLONASE) 50 mcg/act nasal spray 2 sprays into each nostril, Starting Fri 2/10/2017, Historical Med      hyoscyamine (ANASPAZ,LEVSIN) 0.125 MG tablet Take 1 tablet (0.125 mg total) by mouth every 4 (four) hours as needed for cramping, Starting Tue 6/16/2020, Normal      ibuprofen (MOTRIN) 800 mg tablet Take 1 tablet (800 mg total) by mouth 3 (three) times a day, Starting Sun 8/23/2020, Print      naproxen (NAPROSYN) 500 mg tablet Take by mouth, Starting Sat 1/17/2015, Historical Med      !!  ondansetron (ZOFRAN) 4 mg tablet Take 4 mg by mouth, Historical Med      !! ondansetron (ZOFRAN) 4 mg tablet Take 1 tablet (4 mg total) by mouth every 8 (eight) hours as needed for nausea or vomiting, Starting Mon 6/1/2020, Normal      PHENOHYTRO 16.2 MG TABS TAKE 1 TABLET BY MOUTH EVERY 6 HOURS AS NEEDED (ABDOMINAL PAIN), Normal      promethazine-dextromethorphan (PHENERGAN-DM) 6.25-15 mg/5 mL oral syrup Take 5 mL by mouth, Starting Fri 2/10/2017, Historical Med      triamcinolone (KENALOG) 0.1 % oral topical paste by Transmucosal route, Starting Tue 5/5/2015, Historical Med       !! - Potential duplicate medications found. Please discuss with provider.              PDMP Review       None            ED Provider  Electronically Signed by             Eula Zhnog PA-C  06/04/24 0573

## 2024-06-04 NOTE — DISCHARGE INSTRUCTIONS
Medications as prescribed.    Follow-up with GI, referral placed.  Follow-up with your PCP and return to the emergency room for any worsening symptoms.

## 2024-07-19 ENCOUNTER — OFFICE VISIT (OUTPATIENT)
Dept: GASTROENTEROLOGY | Facility: CLINIC | Age: 24
End: 2024-07-19
Payer: COMMERCIAL

## 2024-07-19 VITALS
OXYGEN SATURATION: 99 % | DIASTOLIC BLOOD PRESSURE: 74 MMHG | HEIGHT: 69 IN | BODY MASS INDEX: 24.88 KG/M2 | WEIGHT: 168 LBS | TEMPERATURE: 97.4 F | HEART RATE: 74 BPM | SYSTOLIC BLOOD PRESSURE: 118 MMHG

## 2024-07-19 DIAGNOSIS — K59.1 FUNCTIONAL DIARRHEA: ICD-10-CM

## 2024-07-19 DIAGNOSIS — R10.84 GENERALIZED ABDOMINAL PAIN: Primary | ICD-10-CM

## 2024-07-19 DIAGNOSIS — K21.9 GASTROESOPHAGEAL REFLUX DISEASE, UNSPECIFIED WHETHER ESOPHAGITIS PRESENT: ICD-10-CM

## 2024-07-19 PROCEDURE — 99213 OFFICE O/P EST LOW 20 MIN: CPT | Performed by: PHYSICIAN ASSISTANT

## 2024-07-19 RX ORDER — PANTOPRAZOLE SODIUM 40 MG/1
40 TABLET, DELAYED RELEASE ORAL DAILY
Qty: 30 TABLET | Refills: 3 | Status: SHIPPED | OUTPATIENT
Start: 2024-07-19

## 2024-07-19 RX ORDER — ALBUTEROL SULFATE 90 UG/1
AEROSOL, METERED RESPIRATORY (INHALATION)
COMMUNITY
Start: 2024-06-03

## 2024-07-19 NOTE — PATIENT INSTRUCTIONS
Scheduled date of EGD(as of today):8/2/24  Physician performing EGD:Chan  Location of EGD:[place]  Instructions reviewed with patient by:Dmitri cooper  Clearances:  none

## 2024-07-19 NOTE — PROGRESS NOTES
[facility] - Outpatient Consultation  Riley Christina 23 y.o. male MRN: [id number]  Encounter: [id number]          ASSESSMENT AND PLAN:      1. Generalized abdominal pain  2. Gastroesophageal reflux disease, unspecified whether esophagitis present  Symptoms over the past few months  ER visit on 6/4 with chest pain  Symptoms improved temporarily with Pantoprazole for 7 days  Restart PPI  Schedule EGD to rule out PUD, Hpylori, acid damage    GERD dietary and lifestyle modifications reviewed    3. Functional diarrhea  History of IBS-D  Restart fiber supplement and probiotic which had helped in the past  Colonoscopy in 2019 at [facility] was normal    ______________________________________________________________________    HPI:  23 year old male with a history of IBS-D who presents for evaluation of heartburn, chest pain and abdominal pain.  Symptoms have been present for several months.  He believes they began when he has consuming a large number of oranges on a daily basis (at least 3 per day).  He has since stopped but symptoms persist.  He went to the ER on 6/4 as he was having chest pain. Workup was negative.  He was given a 7 day Pantoprazole course which he admits did help.  He denies nausea, vomiting or dysphagia.  He admits to diarrhea.  This is occasional.  He denies any rectal bleeding or severe lower abdominal pain. He was seen here in 2020 at which time he was diagnosed with irritable bowel syndrome and advised to increase his fiber intake. He admits this did help him.  He had a normal colonoscopy in 2019 at [facility].       REVIEW OF SYSTEMS:    CONSTITUTIONAL: Denies any fever, chills, rigors, and weight loss.  HEENT: No earache or tinnitus. Denies hearing loss or visual disturbances.  CARDIOVASCULAR: No chest pain or palpitations.   RESPIRATORY: Denies any cough, hemoptysis, shortness of breath or dyspnea on exertion.  GASTROINTESTINAL: As noted in the History of Present Illness.    GENITOURINARY: No problems with urination. Denies any hematuria or dysuria.  NEUROLOGIC: No dizziness or vertigo, denies headaches.   MUSCULOSKELETAL: Denies any muscle or joint pain.   SKIN: Denies skin rashes or itching.   ENDOCRINE: Denies excessive thirst. Denies intolerance to heat or cold.  PSYCHOSOCIAL: Denies depression or anxiety. Denies any recent memory loss.       Historical Information   Past Medical History:   Diagnosis Date    Migraine     Seizures (HCC)      Past Surgical History:   Procedure Laterality Date    MULTIPLE TOOTH EXTRACTIONS       Social History   Social History     Substance and Sexual Activity   Alcohol Use No     Social History     Substance and Sexual Activity   Drug Use No     Social History     Tobacco Use   Smoking Status Every Day    Current packs/day: 1.00    Types: Cigarettes   Smokeless Tobacco Never     History reviewed. No pertinent family history.    Meds/Allergies       Current Outpatient Medications:     albuterol (PROVENTIL HFA,VENTOLIN HFA) 90 mcg/act inhaler    Butalbital-APAP-Caffeine -40 MG per capsule    pantoprazole (PROTONIX) 40 mg tablet    acetaminophen-codeine (TYLENOL #3) 300-30 mg per tablet    benzonatate (TESSALON PERLES) 100 mg capsule    cephalexin (KEFLEX) 500 mg capsule    cyclobenzaprine (FLEXERIL) 10 mg tablet    famotidine (PEPCID) 20 mg tablet    fluticasone (FLONASE) 50 mcg/act nasal spray    hyoscyamine (ANASPAZ,LEVSIN) 0.125 MG tablet    ibuprofen (MOTRIN) 800 mg tablet    lidocaine (Lidoderm) 5 %    naproxen (NAPROSYN) 500 mg tablet    ondansetron (ZOFRAN) 4 mg tablet    ondansetron (ZOFRAN) 4 mg tablet    ondansetron (ZOFRAN-ODT) 4 mg disintegrating tablet    PHENOHYTRO 16.2 MG TABS    promethazine-dextromethorphan (PHENERGAN-DM) 6.25-15 mg/5 mL oral syrup    triamcinolone (KENALOG) 0.1 % oral topical paste    No Known Allergies        Objective     Blood pressure 118/74, pulse 74, temperature (!) 97.4 °F (36.3 °C), temperature source  "Tympanic, height 5' 9\" (1.753 m), weight 76.2 kg (168 lb), SpO2 99%. Body mass index is 24.81 kg/m².        PHYSICAL EXAM:      General Appearance:   Alert, cooperative, no distress   HEENT:   Normocephalic, atraumatic, anicteric.     Neck:  Supple, symmetrical, trachea midline   Lungs:   Clear to auscultation bilaterally; no rales, rhonchi or wheezing; respirations unlabored    Heart::   Regular rate and rhythm; no murmur, rub, or gallop.   Abdomen:   Soft, non-tender, non-distended; normal bowel sounds; no masses, no organomegaly    Genitalia:   Deferred    Rectal:   Deferred    Extremities:  No cyanosis, clubbing or edema    Pulses:  2+ and symmetric    Skin:  No jaundice, rashes, or lesions    Lymph nodes:  No palpable cervical lymphadenopathy        Lab Results:   No visits with results within 1 Day(s) from this visit.   Latest known visit with results is:   Admission on 06/04/2024, Discharged on 06/04/2024   Component Date Value    WBC 06/04/2024 14.66 (H)     RBC 06/04/2024 4.68     Hemoglobin 06/04/2024 14.1     Hematocrit 06/04/2024 40.9     MCV 06/04/2024 87     MCH 06/04/2024 30.1     MCHC 06/04/2024 34.5     RDW 06/04/2024 13.4     MPV 06/04/2024 10.0     Platelets 06/04/2024 220     nRBC 06/04/2024 0     Segmented % 06/04/2024 79 (H)     Immature Grans % 06/04/2024 0     Lymphocytes % 06/04/2024 9 (L)     Monocytes % 06/04/2024 12     Eosinophils Relative 06/04/2024 0     Basophils Relative 06/04/2024 0     Absolute Neutrophils 06/04/2024 11.49 (H)     Absolute Immature Grans 06/04/2024 0.06     Absolute Lymphocytes 06/04/2024 1.25     Absolute Monocytes 06/04/2024 1.79 (H)     Eosinophils Absolute 06/04/2024 0.03     Basophils Absolute 06/04/2024 0.04     Sodium 06/04/2024 137     Potassium 06/04/2024 2.9 (L)     Chloride 06/04/2024 104     CO2 06/04/2024 24     ANION GAP 06/04/2024 9     BUN 06/04/2024 6     Creatinine 06/04/2024 0.98     Glucose 06/04/2024 105     Calcium 06/04/2024 8.9     AST " 06/04/2024 16     ALT 06/04/2024 14     Alkaline Phosphatase 06/04/2024 59     Total Protein 06/04/2024 6.9     Albumin 06/04/2024 4.2     Total Bilirubin 06/04/2024 0.33     eGFR 06/04/2024 108     hs TnI 0hr 06/04/2024 20     SARS-CoV-2 06/04/2024 Negative     INFLUENZA A PCR 06/04/2024 Negative     INFLUENZA B PCR 06/04/2024 Negative     RSV PCR 06/04/2024 Negative     Ventricular Rate 06/04/2024 84     Atrial Rate 06/04/2024 91     MN Interval 06/04/2024 156     QRSD Interval 06/04/2024 90     QT Interval 06/04/2024 354     QTC Interval 06/04/2024 418     P Axis 06/04/2024 70     QRS Axis 06/04/2024 78     T Wave [place] 06/04/2024 30     hs TnI 2hr 06/04/2024 19     Delta 2hr hsTnI 06/04/2024 -1     Lipase 06/04/2024 6 (L)          Radiology Results:   No results found.

## 2024-07-19 NOTE — H&P (VIEW-ONLY)
[facility] - Outpatient Consultation  Riley Christina 23 y.o. male MRN: [id number]  Encounter: [id number]          ASSESSMENT AND PLAN:      1. Generalized abdominal pain  2. Gastroesophageal reflux disease, unspecified whether esophagitis present  Symptoms over the past few months  ER visit on 6/4 with chest pain  Symptoms improved temporarily with Pantoprazole for 7 days  Restart PPI  Schedule EGD to rule out PUD, Hpylori, acid damage    GERD dietary and lifestyle modifications reviewed    3. Functional diarrhea  History of IBS-D  Restart fiber supplement and probiotic which had helped in the past  Colonoscopy in 2019 at [facility] was normal    ______________________________________________________________________    HPI:  23 year old male with a history of IBS-D who presents for evaluation of heartburn, chest pain and abdominal pain.  Symptoms have been present for several months.  He believes they began when he has consuming a large number of oranges on a daily basis (at least 3 per day).  He has since stopped but symptoms persist.  He went to the ER on 6/4 as he was having chest pain. Workup was negative.  He was given a 7 day Pantoprazole course which he admits did help.  He denies nausea, vomiting or dysphagia.  He admits to diarrhea.  This is occasional.  He denies any rectal bleeding or severe lower abdominal pain. He was seen here in 2020 at which time he was diagnosed with irritable bowel syndrome and advised to increase his fiber intake. He admits this did help him.  He had a normal colonoscopy in 2019 at [facility].       REVIEW OF SYSTEMS:    CONSTITUTIONAL: Denies any fever, chills, rigors, and weight loss.  HEENT: No earache or tinnitus. Denies hearing loss or visual disturbances.  CARDIOVASCULAR: No chest pain or palpitations.   RESPIRATORY: Denies any cough, hemoptysis, shortness of breath or dyspnea on exertion.  GASTROINTESTINAL: As noted in the History of Present Illness.    GENITOURINARY: No problems with urination. Denies any hematuria or dysuria.  NEUROLOGIC: No dizziness or vertigo, denies headaches.   MUSCULOSKELETAL: Denies any muscle or joint pain.   SKIN: Denies skin rashes or itching.   ENDOCRINE: Denies excessive thirst. Denies intolerance to heat or cold.  PSYCHOSOCIAL: Denies depression or anxiety. Denies any recent memory loss.       Historical Information   Past Medical History:   Diagnosis Date    Migraine     Seizures (HCC)      Past Surgical History:   Procedure Laterality Date    MULTIPLE TOOTH EXTRACTIONS       Social History   Social History     Substance and Sexual Activity   Alcohol Use No     Social History     Substance and Sexual Activity   Drug Use No     Social History     Tobacco Use   Smoking Status Every Day    Current packs/day: 1.00    Types: Cigarettes   Smokeless Tobacco Never     History reviewed. No pertinent family history.    Meds/Allergies       Current Outpatient Medications:     albuterol (PROVENTIL HFA,VENTOLIN HFA) 90 mcg/act inhaler    Butalbital-APAP-Caffeine -40 MG per capsule    pantoprazole (PROTONIX) 40 mg tablet    acetaminophen-codeine (TYLENOL #3) 300-30 mg per tablet    benzonatate (TESSALON PERLES) 100 mg capsule    cephalexin (KEFLEX) 500 mg capsule    cyclobenzaprine (FLEXERIL) 10 mg tablet    famotidine (PEPCID) 20 mg tablet    fluticasone (FLONASE) 50 mcg/act nasal spray    hyoscyamine (ANASPAZ,LEVSIN) 0.125 MG tablet    ibuprofen (MOTRIN) 800 mg tablet    lidocaine (Lidoderm) 5 %    naproxen (NAPROSYN) 500 mg tablet    ondansetron (ZOFRAN) 4 mg tablet    ondansetron (ZOFRAN) 4 mg tablet    ondansetron (ZOFRAN-ODT) 4 mg disintegrating tablet    PHENOHYTRO 16.2 MG TABS    promethazine-dextromethorphan (PHENERGAN-DM) 6.25-15 mg/5 mL oral syrup    triamcinolone (KENALOG) 0.1 % oral topical paste    No Known Allergies        Objective     Blood pressure 118/74, pulse 74, temperature (!) 97.4 °F (36.3 °C), temperature source  "Tympanic, height 5' 9\" (1.753 m), weight 76.2 kg (168 lb), SpO2 99%. Body mass index is 24.81 kg/m².        PHYSICAL EXAM:      General Appearance:   Alert, cooperative, no distress   HEENT:   Normocephalic, atraumatic, anicteric.     Neck:  Supple, symmetrical, trachea midline   Lungs:   Clear to auscultation bilaterally; no rales, rhonchi or wheezing; respirations unlabored    Heart::   Regular rate and rhythm; no murmur, rub, or gallop.   Abdomen:   Soft, non-tender, non-distended; normal bowel sounds; no masses, no organomegaly    Genitalia:   Deferred    Rectal:   Deferred    Extremities:  No cyanosis, clubbing or edema    Pulses:  2+ and symmetric    Skin:  No jaundice, rashes, or lesions    Lymph nodes:  No palpable cervical lymphadenopathy        Lab Results:   No visits with results within 1 Day(s) from this visit.   Latest known visit with results is:   Admission on 06/04/2024, Discharged on 06/04/2024   Component Date Value    WBC 06/04/2024 14.66 (H)     RBC 06/04/2024 4.68     Hemoglobin 06/04/2024 14.1     Hematocrit 06/04/2024 40.9     MCV 06/04/2024 87     MCH 06/04/2024 30.1     MCHC 06/04/2024 34.5     RDW 06/04/2024 13.4     MPV 06/04/2024 10.0     Platelets 06/04/2024 220     nRBC 06/04/2024 0     Segmented % 06/04/2024 79 (H)     Immature Grans % 06/04/2024 0     Lymphocytes % 06/04/2024 9 (L)     Monocytes % 06/04/2024 12     Eosinophils Relative 06/04/2024 0     Basophils Relative 06/04/2024 0     Absolute Neutrophils 06/04/2024 11.49 (H)     Absolute Immature Grans 06/04/2024 0.06     Absolute Lymphocytes 06/04/2024 1.25     Absolute Monocytes 06/04/2024 1.79 (H)     Eosinophils Absolute 06/04/2024 0.03     Basophils Absolute 06/04/2024 0.04     Sodium 06/04/2024 137     Potassium 06/04/2024 2.9 (L)     Chloride 06/04/2024 104     CO2 06/04/2024 24     ANION GAP 06/04/2024 9     BUN 06/04/2024 6     Creatinine 06/04/2024 0.98     Glucose 06/04/2024 105     Calcium 06/04/2024 8.9     AST " 06/04/2024 16     ALT 06/04/2024 14     Alkaline Phosphatase 06/04/2024 59     Total Protein 06/04/2024 6.9     Albumin 06/04/2024 4.2     Total Bilirubin 06/04/2024 0.33     eGFR 06/04/2024 108     hs TnI 0hr 06/04/2024 20     SARS-CoV-2 06/04/2024 Negative     INFLUENZA A PCR 06/04/2024 Negative     INFLUENZA B PCR 06/04/2024 Negative     RSV PCR 06/04/2024 Negative     Ventricular Rate 06/04/2024 84     Atrial Rate 06/04/2024 91     [place] Interval 06/04/2024 156     QRSD Interval 06/04/2024 90     QT Interval 06/04/2024 354     QTC Interval 06/04/2024 418     P Axis 06/04/2024 70     QRS Axis 06/04/2024 78     T Wave [place] 06/04/2024 30     hs TnI 2hr 06/04/2024 19     Delta 2hr hsTnI 06/04/2024 -1     Lipase 06/04/2024 6 (L)          Radiology Results:   No results found.

## 2024-08-02 ENCOUNTER — ANESTHESIA (OUTPATIENT)
Dept: GASTROENTEROLOGY | Facility: HOSPITAL | Age: 24
End: 2024-08-02

## 2024-08-02 ENCOUNTER — ANESTHESIA EVENT (OUTPATIENT)
Dept: GASTROENTEROLOGY | Facility: HOSPITAL | Age: 24
End: 2024-08-02

## 2024-08-02 ENCOUNTER — HOSPITAL ENCOUNTER (OUTPATIENT)
Dept: GASTROENTEROLOGY | Facility: HOSPITAL | Age: 24
Setting detail: OUTPATIENT SURGERY
End: 2024-08-02
Payer: COMMERCIAL

## 2024-08-02 VITALS
DIASTOLIC BLOOD PRESSURE: 57 MMHG | TEMPERATURE: 97.1 F | SYSTOLIC BLOOD PRESSURE: 112 MMHG | HEART RATE: 68 BPM | RESPIRATION RATE: 14 BRPM | OXYGEN SATURATION: 98 %

## 2024-08-02 DIAGNOSIS — K21.9 GASTROESOPHAGEAL REFLUX DISEASE, UNSPECIFIED WHETHER ESOPHAGITIS PRESENT: ICD-10-CM

## 2024-08-02 DIAGNOSIS — R10.84 GENERALIZED ABDOMINAL PAIN: ICD-10-CM

## 2024-08-02 PROCEDURE — 43239 EGD BIOPSY SINGLE/MULTIPLE: CPT | Performed by: INTERNAL MEDICINE

## 2024-08-02 PROCEDURE — 88305 TISSUE EXAM BY PATHOLOGIST: CPT | Performed by: PATHOLOGY

## 2024-08-02 RX ORDER — PROPOFOL 10 MG/ML
INJECTION, EMULSION INTRAVENOUS AS NEEDED
Status: DISCONTINUED | OUTPATIENT
Start: 2024-08-02 | End: 2024-08-02

## 2024-08-02 RX ORDER — SODIUM CHLORIDE, SODIUM LACTATE, POTASSIUM CHLORIDE, CALCIUM CHLORIDE 600; 310; 30; 20 MG/100ML; MG/100ML; MG/100ML; MG/100ML
50 INJECTION, SOLUTION INTRAVENOUS CONTINUOUS
Status: DISPENSED | OUTPATIENT
Start: 2024-08-02

## 2024-08-02 RX ORDER — SODIUM CHLORIDE, SODIUM LACTATE, POTASSIUM CHLORIDE, CALCIUM CHLORIDE 600; 310; 30; 20 MG/100ML; MG/100ML; MG/100ML; MG/100ML
INJECTION, SOLUTION INTRAVENOUS CONTINUOUS PRN
Status: DISCONTINUED | OUTPATIENT
Start: 2024-08-02 | End: 2024-08-02

## 2024-08-02 RX ORDER — LIDOCAINE HYDROCHLORIDE 20 MG/ML
INJECTION, SOLUTION EPIDURAL; INFILTRATION; INTRACAUDAL; PERINEURAL AS NEEDED
Status: DISCONTINUED | OUTPATIENT
Start: 2024-08-02 | End: 2024-08-02

## 2024-08-02 RX ADMIN — SODIUM CHLORIDE, SODIUM LACTATE, POTASSIUM CHLORIDE, AND CALCIUM CHLORIDE 50 ML/HR: .6; .31; .03; .02 INJECTION, SOLUTION INTRAVENOUS at 07:36

## 2024-08-02 RX ADMIN — PROPOFOL 50 MG: 10 INJECTION, EMULSION INTRAVENOUS at 08:47

## 2024-08-02 RX ADMIN — SODIUM CHLORIDE, SODIUM LACTATE, POTASSIUM CHLORIDE, AND CALCIUM CHLORIDE: .6; .31; .03; .02 INJECTION, SOLUTION INTRAVENOUS at 08:38

## 2024-08-02 RX ADMIN — PROPOFOL 150 MG: 10 INJECTION, EMULSION INTRAVENOUS at 08:45

## 2024-08-02 RX ADMIN — LIDOCAINE HYDROCHLORIDE 50 MG: 20 INJECTION, SOLUTION EPIDURAL; INFILTRATION; INTRACAUDAL; PERINEURAL at 08:45

## 2024-08-02 NOTE — ANESTHESIA POSTPROCEDURE EVALUATION
Post-Op Assessment Note    CV Status:  Stable  Pain Score: 0    Pain management: adequate       Mental Status:  Sleepy   Hydration Status:  Stable   PONV Controlled:  None   Airway Patency:  Patent     Post Op Vitals Reviewed: Yes    No anethesia notable event occurred.    Staff: CRNA               BP   124/72   Temp   97   Pulse  76   Resp   16   SpO2   100

## 2024-08-02 NOTE — ANESTHESIA PREPROCEDURE EVALUATION
Procedure:  EGD    Relevant Problems   No relevant active problems    IBS-D    Epilepsy- no meds, no seizure since 2019, reports needs to take medication for combination of seizure and migraine which he does not take.   Physical Exam    Airway    Mallampati score: II  TM Distance: >3 FB  Neck ROM: full     Dental       Cardiovascular  Cardiovascular exam normal    Pulmonary  Pulmonary exam normal     Other Findings        Anesthesia Plan  ASA Score- 2     Anesthesia Type- IV sedation with anesthesia with ASA Monitors.         Additional Monitors:     Airway Plan:            Plan Factors-Exercise tolerance (METS): >4 METS.    Chart reviewed. EKG reviewed. Imaging results reviewed. Existing labs reviewed. Patient summary reviewed.    Patient is not a current smoker.              Induction- intravenous.    Postoperative Plan-         Informed Consent- Anesthetic plan and risks discussed with patient.  I personally reviewed this patient with the CRNA. Discussed and agreed on the Anesthesia Plan with the CRNA..

## 2024-08-06 PROCEDURE — 88305 TISSUE EXAM BY PATHOLOGIST: CPT | Performed by: PATHOLOGY

## 2024-11-27 DIAGNOSIS — K21.9 GASTROESOPHAGEAL REFLUX DISEASE, UNSPECIFIED WHETHER ESOPHAGITIS PRESENT: ICD-10-CM

## 2024-11-27 DIAGNOSIS — R10.84 GENERALIZED ABDOMINAL PAIN: ICD-10-CM

## 2024-11-27 RX ORDER — PANTOPRAZOLE SODIUM 40 MG/1
40 TABLET, DELAYED RELEASE ORAL DAILY
Qty: 30 TABLET | Refills: 5 | Status: SHIPPED | OUTPATIENT
Start: 2024-11-27

## 2024-11-27 NOTE — TELEPHONE ENCOUNTER
Received a fax from [facility] requesting a medication refill for PANTOPRAZOLE 40 MG   Please send Rx. Thanks.